# Patient Record
Sex: FEMALE | Race: BLACK OR AFRICAN AMERICAN | NOT HISPANIC OR LATINO | Employment: OTHER | URBAN - METROPOLITAN AREA
[De-identification: names, ages, dates, MRNs, and addresses within clinical notes are randomized per-mention and may not be internally consistent; named-entity substitution may affect disease eponyms.]

---

## 2023-05-25 ENCOUNTER — PATIENT OUTREACH (OUTPATIENT)
Dept: CARE COORDINATION | Facility: CLINIC | Age: 73
End: 2023-05-25

## 2023-05-25 NOTE — PROGRESS NOTES
Outreach call to patient to support a smooth transition of care from recent admission.  Spoke with patient, reviewed discharge medications, discharge instructions, assessed social needs, and provided education on importance of follow-up appointment with providers. Enrolled patient in [employer] chatbot for additional support and education through transition period.  Patient states she is doing well, active with [facility]. Will outreach patient after PCP appointment on 5/30/23. Patient voiced understanding. Will continue to monitor through transition period.

## 2023-06-06 ENCOUNTER — PATIENT OUTREACH (OUTPATIENT)
Dept: CARE COORDINATION | Facility: CLINIC | Age: 73
End: 2023-06-06

## 2023-06-06 NOTE — PROGRESS NOTES
Outreach to patient post hospital discharge. Patient doing well, going to PCP appointment today. Patient states she is feeling good, compliant with medications and active with home care. Denies needs at this time. Will outreach in one month, patient voiced understanding.

## 2023-07-14 ENCOUNTER — PATIENT OUTREACH (OUTPATIENT)
Dept: CARE COORDINATION | Facility: CLINIC | Age: 73
End: 2023-07-14
Payer: MEDICARE

## 2023-07-18 ENCOUNTER — PATIENT OUTREACH (OUTPATIENT)
Dept: CARE COORDINATION | Facility: CLINIC | Age: 73
End: 2023-07-18
Payer: MEDICARE

## 2023-07-18 NOTE — PROGRESS NOTES
Outreach to patient by CM for follow up. Spoke to patient, she states she is feeling better. Patient states she was inpatient at [facility] in mid June and was in a rehab for therapy, just returned home on 7/9/23. Patient active with home care. Reviewed upcoming appointments with patient, patient to see PCP tomorrow and Cardiology. No needs identified. Patient has CM phone number for future needs. Will outreach patient next month, patient voiced understanding.

## 2023-08-04 ENCOUNTER — PATIENT OUTREACH (OUTPATIENT)
Dept: CARE COORDINATION | Facility: CLINIC | Age: 73
End: 2023-08-04
Payer: MEDICARE

## 2023-08-21 ENCOUNTER — PATIENT OUTREACH (OUTPATIENT)
Dept: CARE COORDINATION | Facility: CLINIC | Age: 73
End: 2023-08-21
Payer: MEDICARE

## 2023-08-21 NOTE — PROGRESS NOTES
Outreach call to patient to check in 90 days after hospital discharge to support smooth transition of care.  Left voicemail message .No additional outreach needed at this time.

## 2023-11-29 PROBLEM — I25.10 CAD (CORONARY ARTERY DISEASE): Status: ACTIVE | Noted: 2023-11-29

## 2023-11-29 PROBLEM — Z87.891 FORMER SMOKER: Status: ACTIVE | Noted: 2023-11-29

## 2023-11-29 PROBLEM — Z95.5 STATUS POST INSERTION OF DRUG ELUTING CORONARY ARTERY STENT: Status: ACTIVE | Noted: 2023-11-29

## 2023-11-29 PROBLEM — E78.2 MIXED HYPERLIPIDEMIA: Status: ACTIVE | Noted: 2023-11-29

## 2023-11-29 PROBLEM — I10 HYPERTENSION, BENIGN: Status: ACTIVE | Noted: 2023-11-29

## 2023-11-29 PROBLEM — E11.9 DIABETES (MULTI): Status: ACTIVE | Noted: 2023-11-29

## 2023-11-29 PROBLEM — I22.2 SUBSEQUENT NON-ST ELEVATION (NSTEMI) MYOCARDIAL INFARCTION (MULTI): Status: ACTIVE | Noted: 2023-11-29

## 2023-11-29 PROBLEM — Z95.1 S/P CABG X 4: Status: ACTIVE | Noted: 2023-11-29

## 2023-11-29 RX ORDER — CLOPIDOGREL BISULFATE 75 MG/1
75 TABLET ORAL DAILY
COMMUNITY
Start: 2023-06-15 | End: 2024-04-09

## 2023-11-29 RX ORDER — ASPIRIN 81 MG/1
81 TABLET ORAL DAILY
COMMUNITY
Start: 2023-06-15

## 2023-11-29 RX ORDER — LETROZOLE 2.5 MG/1
2.5 TABLET, FILM COATED ORAL DAILY
COMMUNITY
Start: 2023-04-10

## 2023-11-29 RX ORDER — ABEMACICLIB 100 MG/1
100 TABLET ORAL 2 TIMES DAILY
COMMUNITY
Start: 2023-07-28

## 2023-11-29 RX ORDER — METOPROLOL SUCCINATE 25 MG/1
25 TABLET, EXTENDED RELEASE ORAL DAILY
COMMUNITY
Start: 2023-03-08 | End: 2023-12-04 | Stop reason: ALTCHOICE

## 2023-11-29 RX ORDER — GABAPENTIN 300 MG/1
600 CAPSULE ORAL NIGHTLY
COMMUNITY
Start: 2023-04-10

## 2023-11-29 RX ORDER — ATORVASTATIN CALCIUM 80 MG/1
80 TABLET, FILM COATED ORAL NIGHTLY
COMMUNITY
Start: 2023-06-15 | End: 2024-01-29 | Stop reason: SDUPTHER

## 2023-11-29 RX ORDER — SITAGLIPTIN 100 MG/1
50 TABLET, FILM COATED ORAL DAILY
COMMUNITY
Start: 2023-07-31

## 2023-12-04 ENCOUNTER — OFFICE VISIT (OUTPATIENT)
Dept: CARDIOLOGY | Facility: CLINIC | Age: 73
End: 2023-12-04
Payer: MEDICARE

## 2023-12-04 VITALS
HEIGHT: 61 IN | BODY MASS INDEX: 22.28 KG/M2 | WEIGHT: 118 LBS | DIASTOLIC BLOOD PRESSURE: 76 MMHG | HEART RATE: 80 BPM | SYSTOLIC BLOOD PRESSURE: 128 MMHG

## 2023-12-04 DIAGNOSIS — I25.10 CORONARY ARTERY DISEASE INVOLVING NATIVE CORONARY ARTERY OF NATIVE HEART WITHOUT ANGINA PECTORIS: Primary | ICD-10-CM

## 2023-12-04 DIAGNOSIS — E78.2 MIXED HYPERLIPIDEMIA: ICD-10-CM

## 2023-12-04 DIAGNOSIS — I10 HYPERTENSION, BENIGN: ICD-10-CM

## 2023-12-04 DIAGNOSIS — E11.9 TYPE 2 DIABETES MELLITUS WITHOUT COMPLICATION, WITHOUT LONG-TERM CURRENT USE OF INSULIN (MULTI): ICD-10-CM

## 2023-12-04 PROBLEM — Z95.1 S/P CABG X 4: Status: RESOLVED | Noted: 2023-11-29 | Resolved: 2023-12-04

## 2023-12-04 PROBLEM — Z95.5 STATUS POST INSERTION OF DRUG ELUTING CORONARY ARTERY STENT: Status: RESOLVED | Noted: 2023-11-29 | Resolved: 2023-12-04

## 2023-12-04 PROBLEM — I22.2 SUBSEQUENT NON-ST ELEVATION (NSTEMI) MYOCARDIAL INFARCTION (MULTI): Status: RESOLVED | Noted: 2023-11-29 | Resolved: 2023-12-04

## 2023-12-04 PROCEDURE — 1160F RVW MEDS BY RX/DR IN RCRD: CPT | Performed by: NURSE PRACTITIONER

## 2023-12-04 PROCEDURE — 1036F TOBACCO NON-USER: CPT | Performed by: NURSE PRACTITIONER

## 2023-12-04 PROCEDURE — 3074F SYST BP LT 130 MM HG: CPT | Performed by: NURSE PRACTITIONER

## 2023-12-04 PROCEDURE — 99213 OFFICE O/P EST LOW 20 MIN: CPT | Performed by: NURSE PRACTITIONER

## 2023-12-04 PROCEDURE — 1159F MED LIST DOCD IN RCRD: CPT | Performed by: NURSE PRACTITIONER

## 2023-12-04 PROCEDURE — 3078F DIAST BP <80 MM HG: CPT | Performed by: NURSE PRACTITIONER

## 2023-12-04 PROCEDURE — 3044F HG A1C LEVEL LT 7.0%: CPT | Performed by: NURSE PRACTITIONER

## 2023-12-04 ASSESSMENT — ENCOUNTER SYMPTOMS
ORTHOPNEA: 0
PALPITATIONS: 0
SYNCOPE: 0
DYSPNEA ON EXERTION: 0
IRREGULAR HEARTBEAT: 0
PND: 0
NEAR-SYNCOPE: 0

## 2023-12-04 NOTE — ASSESSMENT & PLAN NOTE
Tolerating high intensity statin  Lipids have not been checked -she will be seeing PCP in approximately 2 weeks, she has been provided with lab slip if he does not order at that time.

## 2023-12-04 NOTE — PROGRESS NOTES
"Chief Complaint  \"doing fine\"     Reason for Visit  4-month follow-up  Patient presents to the office today for outpatient follow-up for diabetes, hyperlipidemia, coronary artery disease, and status post CABG  Last evaluated in clinic by myself August 2023.    Presents today ambulatory with steady gait.  Accompanied by spouse  Patient denies any hospitalizations or significant changes to interval medical history since last office follow-up.   She continues to follow routinely with oncology due to breast cancer, has left upper extremity lymphedema with compression stocking on.    History of Present Illness   Patient is an extremely pleasant 73-year-old female who presents to the office today without active cardiovascular complaints.  She continues to go through cardiac rehab.  She is active doing ADLs, housework including vacuuming and mopping and going to the grocery store.  She denies any recurrence of her anginal arm pain, no utilization of nitroglycerin.  Denies shortness of breath.  Reports overall improvement in exercise tolerance and functional capacity.    Patient reports that overall has no complaint(s) of chest pain, dyspnea, exertional chest pressure/discomfort, fatigue, lower extremity edema, palpitations, and paroxysmal nocturnal dyspnea    The importance of secondary prevention reviewed:  HTN: Optimal in no treatment  HLD: High intensity statin, is due for labs  DM: Optimally treated  Smoker: Denies  BMI:  Reviewed the merits of healthy lifestyle choices on overall cardiovascular health.    Overall patient is pleased with current state of cardiovascular health.  At this time there are no indications for additional cardiovascular testing or need for medication changes.     Review of Systems   Cardiovascular:  Negative for chest pain, dyspnea on exertion, irregular heartbeat, leg swelling, near-syncope, orthopnea, palpitations, paroxysmal nocturnal dyspnea and syncope.        Visit Vitals  /76 (BP " "Location: Right arm, Patient Position: Sitting)   Pulse 80   Ht 1.549 m (5' 1\")   Wt 53.5 kg (118 lb)   BMI 22.30 kg/m²   Smoking Status Former   BSA 1.52 m²     Physical Exam  Vitals and nursing note reviewed.   HENT:      Head: Normocephalic.   Cardiovascular:      Rate and Rhythm: Normal rate and regular rhythm.      Heart sounds: Normal heart sounds.   Pulmonary:      Effort: Pulmonary effort is normal.      Breath sounds: Normal breath sounds.   Abdominal:      Palpations: Abdomen is soft.   Musculoskeletal:      Right lower leg: No edema.      Left lower leg: No edema.   Skin:     General: Skin is warm and dry.   Neurological:      General: No focal deficit present.      Mental Status: She is alert.   Psychiatric:         Mood and Affect: Mood normal.         Behavior: Behavior normal.        Allergies   Allergen Reactions    Cephalexin Other       Current Outpatient Medications   Medication Instructions    aspirin 81 mg, oral, Daily    atorvastatin (LIPITOR) 80 mg, oral, Nightly    clopidogrel (PLAVIX) 75 mg, oral, Daily    gabapentin (NEURONTIN) 600 mg, oral, Nightly    Januvia 50 mg, oral, Daily    letrozole (FEMARA) 2.5 mg, oral, Daily    Verzenio 100 mg, oral, 2 times daily      Assessment:    CAD (coronary artery disease)  February 2023 [place] [facility] [place] Pierre  February 27, 2023  pCX guide/Todd 3 x 15 mm  Mid LAD 30%  RCA 30%    April 27, 2023 presented with additional NSTEMI transferred to [facility]  May 11, 2023 CABG  LIMA-LAD  Sequential SVG RCA-PLCX  SVG-CX    June 2023 echo LVEF greater than 70%        Hypertension, benign  Optimal off all antihypertensives  Was on midodrine for a brief period of time postop    Mixed hyperlipidemia  Tolerating high intensity statin  Lipids have not been checked -she will be seeing PCP in approximately 2 weeks, she has been provided with lab slip if he does not order at that time.    Diabetes (CMS/HCC)  On statin  No ACE/ARB due to hypotension  Reports " most recent hemoglobin A1c 5.0    Plan:     Through informed decision making process incorporating patients unique circumstances, the following treatment plan will be initiated:    1.  Prescription drug management of cardiovascular medication for efficacy, adherence to treatment, side effect assessment and polypharmacy. Current treatment clinically warranted and to continue without modifications.    2. Labs (lipids) complete if PCP does not order    3. Return for follow-up; in the interim, contact the office if new symptoms arise.  Dr. Ignacio ortega     Discussed the dynamic nature of coronary artery disease and the importance of seeking medical attention if new symptoms arise.    Rachelle Calderon  MSN, APRN-CNP, PMHNP-BC  [facility]    Please excuse any errors in grammar or translation related to this dictation. Voice recognition software was utilized to prepare this document.

## 2023-12-04 NOTE — ASSESSMENT & PLAN NOTE
February 2023 NSTEMI admit [facility] manage [place]. Pierre  February 27, 2023  pCX guide/Todd 3 x 15 mm  Mid LAD 30%  RCA 30%    April 27, 2023 presented with additional NSTEMI transferred to [facility]  May 11, 2023 CABG  LIMA-LAD  Sequential SVG RCA-PLCX  SVG-CX    June 2023 echo LVEF greater than 70%

## 2023-12-04 NOTE — PATIENT INSTRUCTIONS
Please bring all medicines, vitamins, and herbal supplements with you when you come to the office.    Prescriptions will not be filled unless you are compliant with your follow up appointments or have a follow up appointment scheduled as per instruction of your physician. Refills should be requested at the time of your visit.     PLAN:   Through informed decision making process incorporating patients unique circumstances, the following treatment plan will be initiated:    1.  Prescription drug management of cardiovascular medication for efficacy, adherence to treatment, side effect assessment and polypharmacy. Current treatment clinically warranted and to continue without modifications.    2. Labs (lipids) complete if PCP does not order    3. Return for follow-up; in the interim, contact the office if new symptoms arise.  Dr. Pierre  months

## 2024-01-29 DIAGNOSIS — E78.2 MIXED HYPERLIPIDEMIA: ICD-10-CM

## 2024-01-29 RX ORDER — ATORVASTATIN CALCIUM 80 MG/1
80 TABLET, FILM COATED ORAL NIGHTLY
Qty: 90 TABLET | Refills: 3 | Status: SHIPPED | OUTPATIENT
Start: 2024-01-29 | End: 2025-01-28

## 2024-04-07 DIAGNOSIS — I25.10 CORONARY ARTERY DISEASE INVOLVING NATIVE CORONARY ARTERY OF NATIVE HEART WITHOUT ANGINA PECTORIS: ICD-10-CM

## 2024-04-09 RX ORDER — CLOPIDOGREL BISULFATE 75 MG/1
TABLET ORAL
Qty: 90 TABLET | Refills: 3 | Status: SHIPPED | OUTPATIENT
Start: 2024-04-09

## 2024-06-18 ENCOUNTER — APPOINTMENT (OUTPATIENT)
Dept: CARDIOLOGY | Facility: CLINIC | Age: 74
End: 2024-06-18
Payer: MEDICARE

## 2024-06-18 VITALS
BODY MASS INDEX: 20.96 KG/M2 | WEIGHT: 111 LBS | HEIGHT: 61 IN | SYSTOLIC BLOOD PRESSURE: 120 MMHG | HEART RATE: 80 BPM | DIASTOLIC BLOOD PRESSURE: 48 MMHG

## 2024-06-18 DIAGNOSIS — Z87.891 FORMER SMOKER: ICD-10-CM

## 2024-06-18 DIAGNOSIS — E11.9 TYPE 2 DIABETES MELLITUS WITHOUT COMPLICATION, WITHOUT LONG-TERM CURRENT USE OF INSULIN (MULTI): ICD-10-CM

## 2024-06-18 DIAGNOSIS — Z95.1 HISTORY OF CORONARY ARTERY BYPASS GRAFT: ICD-10-CM

## 2024-06-18 DIAGNOSIS — R53.83 OTHER FATIGUE: ICD-10-CM

## 2024-06-18 DIAGNOSIS — I10 HYPERTENSION, BENIGN: ICD-10-CM

## 2024-06-18 DIAGNOSIS — E78.2 MIXED HYPERLIPIDEMIA: ICD-10-CM

## 2024-06-18 DIAGNOSIS — I25.10 CORONARY ARTERY DISEASE INVOLVING NATIVE CORONARY ARTERY OF NATIVE HEART WITHOUT ANGINA PECTORIS: ICD-10-CM

## 2024-06-18 PROBLEM — Z85.3 HISTORY OF BREAST CANCER: Status: ACTIVE | Noted: 2024-06-18

## 2024-06-18 PROCEDURE — 1159F MED LIST DOCD IN RCRD: CPT | Performed by: INTERNAL MEDICINE

## 2024-06-18 PROCEDURE — 99213 OFFICE O/P EST LOW 20 MIN: CPT | Performed by: INTERNAL MEDICINE

## 2024-06-18 PROCEDURE — 3074F SYST BP LT 130 MM HG: CPT | Performed by: INTERNAL MEDICINE

## 2024-06-18 PROCEDURE — 3078F DIAST BP <80 MM HG: CPT | Performed by: INTERNAL MEDICINE

## 2024-06-18 PROCEDURE — 1160F RVW MEDS BY RX/DR IN RCRD: CPT | Performed by: INTERNAL MEDICINE

## 2024-06-18 PROCEDURE — 1036F TOBACCO NON-USER: CPT | Performed by: INTERNAL MEDICINE

## 2024-06-18 RX ORDER — CALCIUM CARBONATE/VITAMIN D3 600MG-5MCG
1 TABLET ORAL 2 TIMES DAILY
COMMUNITY
Start: 2023-02-27

## 2024-06-18 RX ORDER — ATORVASTATIN CALCIUM 20 MG/1
20 TABLET, FILM COATED ORAL DAILY
Qty: 90 TABLET | Refills: 3 | Status: SHIPPED | OUTPATIENT
Start: 2024-06-18 | End: 2025-06-18

## 2024-06-18 NOTE — PROGRESS NOTES
"Bree Guillen is a 74 y.o. female       Chief Complaint    Follow-up          74-year-old female returns for follow-up she is doing well other than occasional complaints of cough, fatigue and diarrhea.  The side effects of her Verzenio, are noted for fatigue and diarrhea.  She happens to state that when she holds her atorvastatin, her diarrhea abates significantly as well    Status post four-vessel CABG performed at [facility] on May 11, 2023 consisting of LIMA-LAD, SVG sequenced to RCA-PLV branch of the circumflex, and SVG-lateral circumflex. Left ventricular function is normal; surgery performed by Dr. Crouch.     She has a history of breast carcinoma, status post left mastectomy and chemotherapy, currently remains on oral therapies and followed by Dr. oTdd at [facility]    Recommendations: Decrease atorvastatin to 20 mg daily, omeprazole as needed for acid reflux/cough, follow-up in 1 year           Review of Systems   All other systems reviewed and are negative.           Vitals:    06/18/24 1423   BP: (!) 120/48   BP Location: Right arm   Patient Position: Sitting   Pulse: 80   Weight: 50.3 kg (111 lb)   Height: 1.549 m (5' 1\")        Objective   Physical Exam  Constitutional:       Appearance: Normal appearance.   HENT:      Nose: Nose normal.   Neck:      Vascular: No carotid bruit.   Cardiovascular:      Rate and Rhythm: Normal rate.      Pulses: Normal pulses.      Heart sounds: Normal heart sounds.   Pulmonary:      Effort: Pulmonary effort is normal.   Abdominal:      General: Bowel sounds are normal.      Palpations: Abdomen is soft.   Musculoskeletal:         General: Normal range of motion.      Cervical back: Normal range of motion.      Right lower leg: No edema.      Left lower leg: No edema.   Skin:     General: Skin is warm and dry.   Neurological:      General: No focal deficit present.      Mental Status: She is alert.   Psychiatric:         Mood and Affect: Mood " normal.         Behavior: Behavior normal.         Thought Content: Thought content normal.         Judgment: Judgment normal.         Allergies  Cephalexin     Current Medications    Current Outpatient Medications:     aspirin 81 mg EC tablet, Take 1 tablet (81 mg) by mouth once daily., Disp: , Rfl:     calcium carbonate-vitamin D3 600 mg-5 mcg (200 unit) tablet, 1 tablet 2 times a day., Disp: , Rfl:     clopidogrel (Plavix) 75 mg tablet, TAKE 1 TABLET BY MOUTH ONCE DAILY. REPLACES BRILINTA, Disp: 90 tablet, Rfl: 3    gabapentin (Neurontin) 300 mg capsule, Take 2 capsules (600 mg) by mouth once daily at bedtime., Disp: , Rfl:     glutamine 500 mg tablet, Take by mouth., Disp: , Rfl:     Januvia 100 mg tablet, Take 0.5 tablets (50 mg) by mouth once daily., Disp: , Rfl:     letrozole (Femara) 2.5 mg tablet, Take 1 tablet (2.5 mg total) by mouth once daily., Disp: , Rfl:     Verzenio 100 mg tablet, Take 1 tablet (100 mg) by mouth 2 times a day., Disp: , Rfl:                      Assessment/Plan   1. Coronary artery disease involving native coronary artery of native heart without angina pectoris  Follow Up In Cardiology      2. Mixed hyperlipidemia        3. Hypertension, benign        4. Other fatigue        5. History of coronary artery bypass graft        6. Type 2 diabetes mellitus without complication, without long-term current use of insulin (Multi)        7. Former smoker        8. BMI 20.0-20.9, adult                 Scribe Attestation  By signing my name below, IAnnita LPN Scribe   attest that this documentation has been prepared under the direction and in the presence of Reagan Pierre DO.     Provider Attestation - Scribe documentation    All medical record entries made by the Scribe were at my direction and personally dictated by me. I have reviewed the chart and agree that the record accurately reflects my personal performance of the history, physical exam, discussion and plan.

## 2024-06-18 NOTE — LETTER
"June 18, 2024     Caden Baker MD  [id number]  [place] [address]    Patient: Tess Guillen   YOB: 1950   Date of Visit: 6/18/2024       Dear Dr. Caden Baker MD:    Thank you for referring Tess Guillen to me for evaluation. Below are my notes for this consultation.  If you have questions, please do not hesitate to call me. I look forward to following your patient along with you.       Sincerely,     Reagan Pierre, DO      CC: No Recipients  ______________________________________________________________________________________    Subjective   Tess Guillen is a 74 y.o. female       Chief Complaint    Follow-up          74-year-old female returns for follow-up she is doing well other than occasional complaints of cough, fatigue and diarrhea.  The side effects of her Verzenio, are noted for fatigue and diarrhea.  She happens to state that when she holds her atorvastatin, her diarrhea abates significantly as well    Status post four-vessel CABG performed at [facility] on May 11, 2023 consisting of LIMA-LAD, SVG sequenced to RCA-PLV branch of the circumflex, and SVG-lateral circumflex. Left ventricular function is normal; surgery performed by Dr. Crouch.     She has a history of breast carcinoma, status post left mastectomy and chemotherapy, currently remains on oral therapies and followed by Dr. Todd at [facility]    Recommendations: Decrease atorvastatin to 20 mg daily, omeprazole as needed for acid reflux/cough, follow-up in 1 year           Review of Systems   All other systems reviewed and are negative.           Vitals:    06/18/24 1423   BP: (!) 120/48   BP Location: Right arm   Patient Position: Sitting   Pulse: 80   Weight: 50.3 kg (111 lb)   Height: 1.549 m (5' 1\")        Objective   Physical Exam  Constitutional:       Appearance: Normal appearance.   HENT:      Nose: Nose normal.   Neck:      Vascular: No carotid bruit.   Cardiovascular:      Rate and " Rhythm: Normal rate.      Pulses: Normal pulses.      Heart sounds: Normal heart sounds.   Pulmonary:      Effort: Pulmonary effort is normal.   Abdominal:      General: Bowel sounds are normal.      Palpations: Abdomen is soft.   Musculoskeletal:         General: Normal range of motion.      Cervical back: Normal range of motion.      Right lower leg: No edema.      Left lower leg: No edema.   Skin:     General: Skin is warm and dry.   Neurological:      General: No focal deficit present.      Mental Status: She is alert.   Psychiatric:         Mood and Affect: Mood normal.         Behavior: Behavior normal.         Thought Content: Thought content normal.         Judgment: Judgment normal.         Allergies  Cephalexin     Current Medications    Current Outpatient Medications:   •  aspirin 81 mg EC tablet, Take 1 tablet (81 mg) by mouth once daily., Disp: , Rfl:   •  calcium carbonate-vitamin D3 600 mg-5 mcg (200 unit) tablet, 1 tablet 2 times a day., Disp: , Rfl:   •  clopidogrel (Plavix) 75 mg tablet, TAKE 1 TABLET BY MOUTH ONCE DAILY. REPLACES BRILINTA, Disp: 90 tablet, Rfl: 3  •  gabapentin (Neurontin) 300 mg capsule, Take 2 capsules (600 mg) by mouth once daily at bedtime., Disp: , Rfl:   •  glutamine 500 mg tablet, Take by mouth., Disp: , Rfl:   •  Januvia 100 mg tablet, Take 0.5 tablets (50 mg) by mouth once daily., Disp: , Rfl:   •  letrozole (Femara) 2.5 mg tablet, Take 1 tablet (2.5 mg total) by mouth once daily., Disp: , Rfl:   •  Verzenio 100 mg tablet, Take 1 tablet (100 mg) by mouth 2 times a day., Disp: , Rfl:                      Assessment/Plan   1. Coronary artery disease involving native coronary artery of native heart without angina pectoris  Follow Up In Cardiology      2. Mixed hyperlipidemia        3. Hypertension, benign        4. Other fatigue        5. History of coronary artery bypass graft        6. Type 2 diabetes mellitus without complication, without long-term current use of insulin  (Multi)        7. Former smoker        8. BMI 20.0-20.9, adult                 Scribe Attestation  By signing my name below, I, Annita RABAGO LPN  , Scribe   attest that this documentation has been prepared under the direction and in the presence of Reagan Pierre DO.     Provider Attestation - Scribe documentation    All medical record entries made by the Scribe were at my direction and personally dictated by me. I have reviewed the chart and agree that the record accurately reflects my personal performance of the history, physical exam, discussion and plan.

## 2024-09-10 ENCOUNTER — TELEPHONE (OUTPATIENT)
Dept: CARDIOLOGY | Facility: CLINIC | Age: 74
End: 2024-09-10
Payer: MEDICARE

## 2024-09-10 NOTE — TELEPHONE ENCOUNTER
[facility] phoned requesting a 3 day hold on Plavix and resume 24 hours later for a tooth extraction that has not been scheduled yet. Please advise.    To Dr. Reagan Pierre, DO

## 2025-05-05 DIAGNOSIS — I25.10 CORONARY ARTERY DISEASE INVOLVING NATIVE CORONARY ARTERY OF NATIVE HEART WITHOUT ANGINA PECTORIS: ICD-10-CM

## 2025-05-06 RX ORDER — CLOPIDOGREL BISULFATE 75 MG/1
75 TABLET ORAL DAILY
Qty: 90 TABLET | Refills: 3 | Status: SHIPPED | OUTPATIENT
Start: 2025-05-06 | End: 2026-05-06

## 2025-05-30 DIAGNOSIS — Z95.1 HISTORY OF CORONARY ARTERY BYPASS GRAFT: ICD-10-CM

## 2025-05-30 DIAGNOSIS — E78.2 MIXED HYPERLIPIDEMIA: ICD-10-CM

## 2025-05-30 DIAGNOSIS — I25.10 CORONARY ARTERY DISEASE INVOLVING NATIVE CORONARY ARTERY OF NATIVE HEART WITHOUT ANGINA PECTORIS: ICD-10-CM

## 2025-06-02 RX ORDER — ATORVASTATIN CALCIUM 20 MG/1
20 TABLET, FILM COATED ORAL DAILY
Qty: 90 TABLET | Refills: 3 | Status: SHIPPED | OUTPATIENT
Start: 2025-06-02 | End: 2026-06-02

## 2025-06-18 ENCOUNTER — APPOINTMENT (OUTPATIENT)
Dept: CARDIOLOGY | Facility: CLINIC | Age: 75
End: 2025-06-18
Payer: MEDICARE

## 2025-08-25 ENCOUNTER — TELEPHONE (OUTPATIENT)
Dept: CARDIOLOGY | Facility: CLINIC | Age: 75
End: 2025-08-25
Payer: MEDICARE

## 2025-09-04 ENCOUNTER — APPOINTMENT (OUTPATIENT)
Dept: CARDIOLOGY | Facility: CLINIC | Age: 75
End: 2025-09-04
Payer: MEDICARE

## 2025-09-09 ENCOUNTER — APPOINTMENT (OUTPATIENT)
Dept: CARDIOLOGY | Facility: CLINIC | Age: 75
End: 2025-09-09
Payer: MEDICARE